# Patient Record
Sex: FEMALE | Race: WHITE | URBAN - METROPOLITAN AREA
[De-identification: names, ages, dates, MRNs, and addresses within clinical notes are randomized per-mention and may not be internally consistent; named-entity substitution may affect disease eponyms.]

---

## 2017-12-11 ENCOUNTER — OFFICE VISIT (OUTPATIENT)
Dept: ORTHOPEDIC SURGERY | Age: 32
End: 2017-12-11

## 2017-12-11 VITALS
HEART RATE: 52 BPM | OXYGEN SATURATION: 98 % | SYSTOLIC BLOOD PRESSURE: 99 MMHG | DIASTOLIC BLOOD PRESSURE: 68 MMHG | BODY MASS INDEX: 21.69 KG/M2 | TEMPERATURE: 97.3 F | HEIGHT: 66 IN | WEIGHT: 135 LBS

## 2017-12-11 DIAGNOSIS — M79.672 LEFT FOOT PAIN: ICD-10-CM

## 2017-12-11 DIAGNOSIS — S93.492A SPRAIN OF ANTERIOR TALOFIBULAR LIGAMENT OF LEFT ANKLE, INITIAL ENCOUNTER: Primary | ICD-10-CM

## 2017-12-11 DIAGNOSIS — M25.572 ACUTE LEFT ANKLE PAIN: ICD-10-CM

## 2017-12-11 RX ORDER — DEXTROAMPHETAMINE SACCHARATE, AMPHETAMINE ASPARTATE, DEXTROAMPHETAMINE SULFATE AND AMPHETAMINE SULFATE 2.5; 2.5; 2.5; 2.5 MG/1; MG/1; MG/1; MG/1
TABLET ORAL
Refills: 0 | COMMUNITY
Start: 2017-10-28

## 2017-12-11 RX ORDER — HYDROCODONE BITARTRATE AND ACETAMINOPHEN 5; 325 MG/1; MG/1
1 TABLET ORAL
Qty: 6 TAB | Refills: 0 | Status: SHIPPED | OUTPATIENT
Start: 2017-12-11

## 2017-12-11 RX ORDER — NORGESTIMATE AND ETHINYL ESTRADIOL 0.25-0.035
KIT ORAL
Refills: 12 | COMMUNITY
Start: 2017-11-21

## 2017-12-11 RX ORDER — TRAMADOL HYDROCHLORIDE 50 MG/1
50-100 TABLET ORAL
Qty: 30 TAB | Refills: 0 | Status: SHIPPED | OUTPATIENT
Start: 2017-12-11

## 2017-12-11 RX ORDER — TRAMADOL HYDROCHLORIDE 50 MG/1
50 TABLET ORAL
COMMUNITY

## 2017-12-11 NOTE — PROCEDURES
Ankle and foot x-rays at [facility], today, December 11, 2017:     1. There is some abnormal thickening to the distal one-third tibia and fibula representing what looks like an old fracture to her left distal tibia and fibula. 2. There is a slight increased bone along the medial portion of her distal/media tibia possibly representing an old, nonossifying fibroma. 3. The ankle joint itself is concentrically aligned. 4. There is some swelling medially and some swelling laterally. 5. There is no dislocation of the ankle and/or the transverse tarsal and/or the talonavicular and calcaneal cuboid joints. 6. There is no osteochondral defect seen on these x-rays at this time. The lateral ankle film shows that the ankle and subtalar joints are concentrically aligned. There may be a fracture to the anterior superior surface of the anterior process of the calcaneus. The foot films show metatarsus adductus. No fracture, subluxation, or dislocation to the midfoot or forefoot. There is no evidence of a Lisfranc dislocation or subluxation on these x-rays at this time. 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

## 2017-12-11 NOTE — MR AVS SNAPSHOT
[address], [address] 200 [facility] 
[phone] Patient: Montse Ace MRN: [id number] NPU:[id number] Visit Information Date & Time Provider Department Dept. Phone Encounter #  
 12/11/2017  9:40 AM Tommie Mae, 27 [facility] [phone] [id number] Upcoming Health Maintenance Date Due DTaP/Tdap/Td series (1 - Tdap) 9/30/2006 PAP AKA CERVICAL CYTOLOGY 9/30/2006 Influenza Age 5 to Adult 8/1/2017 Allergies as of 12/11/2017  Review Complete On: 12/11/2017 By: Tommie Mae MD  
  
 Severity Noted Reaction Type Reactions Sulfa (Sulfonamide Antibiotics)  12/11/2017    Rash Current Immunizations  Never Reviewed No immunizations on file. Not reviewed this visit You Were Diagnosed With   
  
 Codes Comments Acute left ankle pain    -  Primary ICD-10-CM: M25.572 ICD-9-CM: 719.47 Left foot pain     ICD-10-CM: Y05.997 ICD-9-CM: 729.5 Vitals BP Pulse Temp Height(growth percentile) Weight(growth percentile) SpO2  
 99/68 (!) 52 97.3 °F (36.3 °C) (Oral) 5' 6\" (1.676 m) 135 lb (61.2 kg) 98% BMI Smoking Status 21.79 kg/m2 Never Smoker BMI and BSA Data Body Mass Index Body Surface Area 21.79 kg/m 2 1.69 m 2 Your Updated Medication List  
  
   
This list is accurate as of: 12/11/17 10:40 AM.  Always use your most recent med list.  
  
  
  
  
 dextroamphetamine-amphetamine 10 mg tablet Commonly known as:  ADDERALL  
TAKE 2 TABLETS BY MOUTH EVERY MORNING AND 1 TABLET EVERY EVENING  
  
 HYDROcodone-acetaminophen 5-325 mg per tablet Commonly known as:  Barnet Cuff Take 1 Tab by mouth two (2) times daily as needed for Pain. Max Daily Amount: 2 Tabs. [facility] (04) 0.25-35 mg-mcg Tab Generic drug:  norgestimate-ethinyl estradiol TAKE 1 TABLET(S) EVERY DAY BY ORAL ROUTE. * traMADol 50 mg tablet Commonly known as:  ULTRAM  
Take 50 mg by mouth every six (6) hours as needed for Pain. * traMADol 50 mg tablet Commonly known as:  ULTRAM  
Take 1-2 Tabs by mouth two (2) times daily as needed for Pain. Max Daily Amount: 200 mg.  
  
 * Notice: This list has 2 medication(s) that are the same as other medications prescribed for you. Read the directions carefully, and ask your doctor or other care provider to review them with you. Prescriptions Printed Refills HYDROcodone-acetaminophen (NORCO) 5-325 mg per tablet 0 Sig: Take 1 Tab by mouth two (2) times daily as needed for Pain. Max Daily Amount: 2 Tabs. Class: Print Route: Oral  
 traMADol (ULTRAM) 50 mg tablet 0 Sig: Take 1-2 Tabs by mouth two (2) times daily as needed for Pain. Max Daily Amount: 200 mg. Class: Print Route: Oral  
  
We Performed the Following AMB POC XRAY, FOOT; COMPLETE, 3+ VIEW [55585 CPT(R)] AMB SUPPLY ORDER [[id number] Custom] Comments:  
 Short CAM walker boot for Left foot/ankle POC XRAY, ANKLE; 2 VIEWS [21941 CPT(R)] To-Do List   
 12/11/2017 Imaging:  CT ANKLE LT WO CONT   
  
 12/11/2017 Imaging:  CT FOOT LT WO CONT Introducing [facility]! Anjali Reyes introduces [employer] patient portal. Now you can access parts of your medical record, email your doctor's office, and request medication refills online. 1. In your internet browser, go to [email or web address] 2. Click on the First Time User? Click Here link in the Sign In box. You will see the New Member Sign Up page. 3. Enter your [employer] Access Code exactly as it appears below. You will not need to use this code after youve completed the sign-up process. If you do not sign up before the expiration date, you must request a new code. · [employer] Access Code: [email or web address] Expires: 3/11/2018 10:40 AM 
 
 4. Enter the last four digits of your Social Security Number (xxxx) and Date of Birth (mm/dd/yyyy) as indicated and click Submit. You will be taken to the next sign-up page. 5. Create a [employer] ID. This will be your [employer] login ID and cannot be changed, so think of one that is secure and easy to remember. 6. Create a [employer] password. You can change your password at any time. 7. Enter your Password Reset Question and Answer. This can be used at a later time if you forget your password. 8. Enter your e-mail address. You will receive e-mail notification when new information is available in [address] 9. Click Sign Up. You can now view and download portions of your medical record. 10. Click the Download Summary menu link to download a portable copy of your medical information. If you have questions, please visit the Frequently Asked Questions section of the [employer] website. Remember, [employer] is NOT to be used for urgent needs. For medical emergencies, dial 911. Now available from your iPhone and Android! Please provide this summary of care documentation to your next provider. If you have any questions after today's visit, please call [phone].

## 2017-12-11 NOTE — LETTER
NOTIFICATION RETURN TO WORK / SCHOOL 
 
12/11/2017 10:47 AM 
 
[address] [address] To Whom It May Concern: 
 
Cam Owen is currently under the care of [facility]. She will return to school and work  on 12-12-17 with the following restrictions: Must use one crutch and must wear short CAM boot. If there are questions or concerns please have the patient contact our office.  
 
 
 
Sincerely, 
 
 
Marc Bautista MD
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
## 2017-12-13 ENCOUNTER — DOCUMENTATION ONLY (OUTPATIENT)
Dept: ORTHOPEDIC SURGERY | Age: 32
End: 2017-12-13